# Patient Record
Sex: MALE | Race: WHITE | Employment: FULL TIME | URBAN - METROPOLITAN AREA
[De-identification: names, ages, dates, MRNs, and addresses within clinical notes are randomized per-mention and may not be internally consistent; named-entity substitution may affect disease eponyms.]

---

## 2018-04-06 PROBLEM — N52.9 ERECTILE DYSFUNCTION, UNSPECIFIED ERECTILE DYSFUNCTION TYPE: Status: ACTIVE | Noted: 2017-04-10

## 2018-04-06 PROBLEM — C83.33 DIFFUSE LARGE B-CELL LYMPHOMA OF INTRA-ABDOMINAL LYMPH NODES (HCC): Status: ACTIVE | Noted: 2017-04-10

## 2018-04-06 PROBLEM — C85.80 LYMPHOMA MALIGNANT, LARGE CELL (HCC): Status: ACTIVE | Noted: 2017-04-10

## 2018-04-09 PROBLEM — N52.9 ERECTILE DYSFUNCTION, UNSPECIFIED ERECTILE DYSFUNCTION TYPE: Status: RESOLVED | Noted: 2017-04-10 | Resolved: 2018-04-09

## 2018-10-05 PROBLEM — Z85.72 PERSONAL HISTORY OF LYMPHOMA: Status: ACTIVE | Noted: 2018-10-05

## 2018-10-05 PROBLEM — C85.80 LYMPHOMA MALIGNANT, LARGE CELL (HCC): Status: RESOLVED | Noted: 2017-04-10 | Resolved: 2018-10-05

## 2018-10-05 PROBLEM — C83.33 DIFFUSE LARGE B-CELL LYMPHOMA OF INTRA-ABDOMINAL LYMPH NODES (HCC): Status: RESOLVED | Noted: 2017-04-10 | Resolved: 2018-10-05

## 2018-10-05 PROCEDURE — 81001 URINALYSIS AUTO W/SCOPE: CPT | Performed by: INTERNAL MEDICINE

## 2019-04-05 PROCEDURE — 81003 URINALYSIS AUTO W/O SCOPE: CPT | Performed by: INTERNAL MEDICINE

## 2019-09-11 ENCOUNTER — OFFICE VISIT (OUTPATIENT)
Dept: SLEEP CENTER | Age: 54
End: 2019-09-11
Attending: INTERNAL MEDICINE
Payer: COMMERCIAL

## 2019-09-11 DIAGNOSIS — R06.83 SNORING: ICD-10-CM

## 2019-09-11 PROCEDURE — 95806 SLEEP STUDY UNATT&RESP EFFT: CPT

## 2019-09-17 NOTE — PROCEDURES
[address] [id number],[facility] [address]       Accredited by the [facility] ([facility])    PATIENT'S NAME:        Triny Yates  ATTENDING PHYSICIAN:   Alfredito Zurita M.D.   REFERRING PHYSICIAN:     PATIENT ACCOUNT #:     1 apnea, further clinical correlation is necessary to determine etiology of the patient's ongoing fatigue. 2.   Treatment for bruxism should be considered.   This is mostly a clinical diagnosis and would not have been discovered on a home sleep study test du

## 2019-09-18 NOTE — PROGRESS NOTES
Results reviewed. Please inform patient **This study revealed evidence of mild benign snoring and was not consistent with obstructive sleep apnea-hypopnea syndrome.

## 2020-12-29 ENCOUNTER — IMMUNIZATION (OUTPATIENT)
Dept: LAB | Age: 55
End: 2020-12-29

## 2020-12-29 DIAGNOSIS — Z23 NEED FOR VACCINATION: Primary | ICD-10-CM

## 2020-12-29 PROCEDURE — 0011A COVID-19 MODERNA VACCINE: CPT | Performed by: CLINICAL NURSE SPECIALIST

## 2020-12-29 PROCEDURE — 91301 COVID-19 MODERNA VACCINE: CPT | Performed by: CLINICAL NURSE SPECIALIST

## 2021-01-25 ENCOUNTER — IMMUNIZATION (OUTPATIENT)
Dept: LAB | Age: 56
End: 2021-01-25
Attending: CLINICAL NURSE SPECIALIST

## 2021-01-25 DIAGNOSIS — Z23 NEED FOR VACCINATION: Primary | ICD-10-CM

## 2021-01-25 PROCEDURE — 91301 COVID-19 MODERNA VACCINE: CPT

## 2021-01-25 PROCEDURE — 0012A COVID-19 MODERNA VACCINE: CPT
